# Patient Record
Sex: FEMALE | Race: OTHER | URBAN - METROPOLITAN AREA
[De-identification: names, ages, dates, MRNs, and addresses within clinical notes are randomized per-mention and may not be internally consistent; named-entity substitution may affect disease eponyms.]

---

## 2024-10-14 ENCOUNTER — ORDER TRANSCRIPTION (OUTPATIENT)
Dept: SLEEP CENTER | Age: 44
End: 2024-10-14

## 2024-10-14 DIAGNOSIS — G47.33 OBSTRUCTIVE SLEEP APNEA (ADULT) (PEDIATRIC): Primary | ICD-10-CM

## 2024-10-31 ENCOUNTER — OFFICE VISIT (OUTPATIENT)
Dept: SLEEP CENTER | Age: 44
End: 2024-10-31
Attending: INTERNAL MEDICINE
Payer: MEDICAID

## 2024-10-31 DIAGNOSIS — G47.33 OBSTRUCTIVE SLEEP APNEA (ADULT) (PEDIATRIC): ICD-10-CM

## 2024-10-31 PROCEDURE — 95806 SLEEP STUDY UNATT&RESP EFFT: CPT

## 2024-11-19 ENCOUNTER — OFFICE VISIT (OUTPATIENT)
Dept: SLEEP CENTER | Age: 44
End: 2024-11-19
Attending: INTERNAL MEDICINE
Payer: MEDICAID

## 2024-11-19 PROCEDURE — 95806 SLEEP STUDY UNATT&RESP EFFT: CPT

## 2024-11-21 NOTE — PROCEDURES
[facility]       Accredited by the American Academy of Sleep Medicine (AASM)    PATIENT'S NAME:        CHAN GOLDEN  ATTENDING PHYSICIAN:   Jameel Hernandez MD  REFERRING PHYSICIAN:     PATIENT ACCOUNT #:     [id number]        LOCATION:       [facility] Center  MEDICAL RECORD #:      [id number]        YOB: 1980  DATE OF STUDY:         11/19/2024    SLEEP STUDY REPORT    STUDY TYPE:  Unattended sleep study.    REFERRING PROVIDER:  Alma Stone NP.    PATIENT CHARACTERISTICS:  Age is 44 years.  Gender female.    HISTORY:  The patient is a 44-year-old female with a history of hypertension and generalized anxiety disorder, and also history of snoring.  This home sleep study is performed for evaluation of obstructive sleep apnea syndrome.    UNATTENDED SLEEP STUDY RECORDING PARAMETERS:  The patient underwent a formal technically adequate unattended diagnostic sleep study coordinated with the [facility].  The study was performed in accordance with the AASM standard for Out of Center Sleep Testing.  The four-channel Type III HST measures the following parameters:  flow, respiratory effort, pulse, and oxygen saturation.    SCORING:  This study was scored in accordance with AASM scoring rules and Medicare rule 1B.    FINDINGS:  The flow evaluation time began at 12:26 a.m. and ended at 6:32 a.m. with a duration of 6 hours and 2 minutes.  Overall apnea-hypopnea index was 2.0 events per hour.  Supine AHI was 2.1 events per hour.  SpO2 nancy was 81%, although it was only transient.  Loud snoring was noted during the sleep study recording.  Average heart rate was 61 beats per minute and maximum heart rate of 148 beats per minute.    IMPRESSION:  Overall apnea-hypopnea index of 2.0 events per hour falls short of diagnosis of obstructive sleep apnea syndrome.  SpO2 nancy was 81%, although it was only transient.    DIAGNOSIS:    1.   Sleep apnea, unspecified  (G47.30).    2.   Snoring.    PLAN:    1.   Based on this study, sleep-disordered breathing cannot be identified. Weight gain, alcohol consumption, and time spent sleeping supine can affect upper airway resistance.  Future sleep testing should be considered if risk factors change.  2.   If obstructive sleep apnea is a strongly suspected, consider repeating a home sleep study or performing an attended sleep study in sleep lab.    Dictated By Jameel Hernandez MD  d: 11/20/2024 18:28:49  t: 11/20/2024 18:46:23  [facility] [id number]  [facility] [place]/    cc: Alma Stone NP